# Patient Record
Sex: FEMALE | Race: ASIAN | URBAN - METROPOLITAN AREA
[De-identification: names, ages, dates, MRNs, and addresses within clinical notes are randomized per-mention and may not be internally consistent; named-entity substitution may affect disease eponyms.]

---

## 2022-07-29 ENCOUNTER — OFFICE VISIT (OUTPATIENT)
Dept: URBAN - METROPOLITAN AREA CLINIC 78 | Facility: CLINIC | Age: 42
End: 2022-07-29

## 2022-09-14 ENCOUNTER — OFFICE VISIT (OUTPATIENT)
Dept: URBAN - METROPOLITAN AREA CLINIC 78 | Facility: CLINIC | Age: 42
End: 2022-09-14

## 2023-04-20 ENCOUNTER — WEB ENCOUNTER (OUTPATIENT)
Dept: URBAN - METROPOLITAN AREA CLINIC 12 | Facility: CLINIC | Age: 43
End: 2023-04-20

## 2023-04-20 ENCOUNTER — OFFICE VISIT (OUTPATIENT)
Dept: URBAN - METROPOLITAN AREA CLINIC 12 | Facility: CLINIC | Age: 43
End: 2023-04-20
Payer: OTHER GOVERNMENT

## 2023-04-20 ENCOUNTER — LAB OUTSIDE AN ENCOUNTER (OUTPATIENT)
Dept: URBAN - METROPOLITAN AREA CLINIC 12 | Facility: CLINIC | Age: 43
End: 2023-04-20

## 2023-04-20 VITALS
BODY MASS INDEX: 24.62 KG/M2 | DIASTOLIC BLOOD PRESSURE: 80 MMHG | HEIGHT: 62 IN | HEART RATE: 66 BPM | WEIGHT: 133.8 LBS | SYSTOLIC BLOOD PRESSURE: 132 MMHG

## 2023-04-20 DIAGNOSIS — R11.0 NAUSEA: ICD-10-CM

## 2023-04-20 DIAGNOSIS — R10.13 EPIGASTRIC ABDOMINAL PAIN: ICD-10-CM

## 2023-04-20 PROCEDURE — 99203 OFFICE O/P NEW LOW 30 MIN: CPT | Performed by: INTERNAL MEDICINE

## 2023-04-20 NOTE — HPI-NAUSEA
Patient states nausea without vomiting.  Patient states nausea is present frequently.  Patient states nausea is worse after eating.  Patient states epigastric abdominal pain.  Patient denies any NSAIDs.

## 2023-04-20 NOTE — HPI-EPIGASTRIC PAIN
Patient presents with abdominal pain.  Patient states pain is the epigastric area.  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  Patient has follow-up with a surgeon.  Patient states pain is dull ache.  Patient denies radiation of the pain.  Patient denies association with eating or fasting.  Patient denies NSAIDs.  Patient states nausea but no vomiting.  Patient denies any fever or chills.

## 2023-04-24 PROBLEM — 79922009: Status: ACTIVE | Noted: 2023-04-24

## 2023-04-27 ENCOUNTER — OFFICE VISIT (OUTPATIENT)
Dept: URBAN - METROPOLITAN AREA CLINIC 35 | Facility: CLINIC | Age: 43
End: 2023-04-27

## 2023-06-20 ENCOUNTER — CLAIMS CREATED FROM THE CLAIM WINDOW (OUTPATIENT)
Dept: URBAN - METROPOLITAN AREA CLINIC 4 | Facility: CLINIC | Age: 43
End: 2023-06-20
Payer: OTHER GOVERNMENT

## 2023-06-20 ENCOUNTER — CLAIMS CREATED FROM THE CLAIM WINDOW (OUTPATIENT)
Dept: URBAN - METROPOLITAN AREA SURGERY CENTER 8 | Facility: SURGERY CENTER | Age: 43
End: 2023-06-20
Payer: OTHER GOVERNMENT

## 2023-06-20 ENCOUNTER — OFFICE VISIT (OUTPATIENT)
Dept: URBAN - METROPOLITAN AREA SURGERY CENTER 8 | Facility: SURGERY CENTER | Age: 43
End: 2023-06-20

## 2023-06-20 DIAGNOSIS — K29.60 OTHER GASTRITIS WITHOUT BLEEDING: ICD-10-CM

## 2023-06-20 DIAGNOSIS — R11.0 NAUSEA: ICD-10-CM

## 2023-06-20 DIAGNOSIS — R10.13 EPIGASTRIC ABDOMINAL PAIN: ICD-10-CM

## 2023-06-20 DIAGNOSIS — K31.89 OTHER DISEASES OF STOMACH AND DUODENUM: ICD-10-CM

## 2023-06-20 DIAGNOSIS — K21.00 ESOPHAGITIS, REFLUX: ICD-10-CM

## 2023-06-20 DIAGNOSIS — K21.9 GASTRO-ESOPHAGEAL REFLUX DISEASE WITHOUT ESOPHAGITIS: ICD-10-CM

## 2023-06-20 PROCEDURE — 88305 TISSUE EXAM BY PATHOLOGIST: CPT | Performed by: PATHOLOGY

## 2023-06-20 PROCEDURE — 88342 IMHCHEM/IMCYTCHM 1ST ANTB: CPT | Performed by: PATHOLOGY

## 2023-06-20 PROCEDURE — 43239 EGD BIOPSY SINGLE/MULTIPLE: CPT | Performed by: INTERNAL MEDICINE

## 2023-06-20 PROCEDURE — G8907 PT DOC NO EVENTS ON DISCHARG: HCPCS | Performed by: INTERNAL MEDICINE

## 2023-06-20 PROCEDURE — 00731 ANES UPR GI NDSC PX NOS: CPT | Performed by: NURSE ANESTHETIST, CERTIFIED REGISTERED

## 2023-06-20 PROCEDURE — 88312 SPECIAL STAINS GROUP 1: CPT | Performed by: PATHOLOGY

## 2023-07-10 ENCOUNTER — DASHBOARD ENCOUNTERS (OUTPATIENT)
Age: 43
End: 2023-07-10

## 2023-07-10 ENCOUNTER — LAB OUTSIDE AN ENCOUNTER (OUTPATIENT)
Dept: URBAN - METROPOLITAN AREA CLINIC 35 | Facility: CLINIC | Age: 43
End: 2023-07-10

## 2023-07-10 ENCOUNTER — TELEPHONE ENCOUNTER (OUTPATIENT)
Dept: URBAN - METROPOLITAN AREA CLINIC 35 | Facility: CLINIC | Age: 43
End: 2023-07-10

## 2023-07-10 ENCOUNTER — OFFICE VISIT (OUTPATIENT)
Dept: URBAN - METROPOLITAN AREA CLINIC 35 | Facility: CLINIC | Age: 43
End: 2023-07-10
Payer: OTHER GOVERNMENT

## 2023-07-10 VITALS
SYSTOLIC BLOOD PRESSURE: 120 MMHG | DIASTOLIC BLOOD PRESSURE: 82 MMHG | HEIGHT: 62 IN | BODY MASS INDEX: 24.11 KG/M2 | WEIGHT: 131 LBS

## 2023-07-10 DIAGNOSIS — K29.30 CHRONIC SUPERFICIAL GASTRITIS WITHOUT BLEEDING: ICD-10-CM

## 2023-07-10 DIAGNOSIS — R19.00 RETROPERITONEAL MASS: ICD-10-CM

## 2023-07-10 DIAGNOSIS — K21.9 GASTRO-ESOPHAGEAL REFLUX DISEASE WITHOUT ESOPHAGITIS: ICD-10-CM

## 2023-07-10 DIAGNOSIS — R10.13 EPIGASTRIC ABDOMINAL PAIN: ICD-10-CM

## 2023-07-10 PROBLEM — 266435005: Status: ACTIVE | Noted: 2023-07-10

## 2023-07-10 PROBLEM — 196735001: Status: ACTIVE | Noted: 2023-07-10

## 2023-07-10 PROCEDURE — 99214 OFFICE O/P EST MOD 30 MIN: CPT | Performed by: PHYSICIAN ASSISTANT

## 2023-07-10 PROCEDURE — 99214 OFFICE O/P EST MOD 30 MIN: CPT | Performed by: INTERNAL MEDICINE

## 2023-07-10 RX ORDER — PANTOPRAZOLE SODIUM 40 MG/1
1 TABLET TABLET, DELAYED RELEASE ORAL ONCE A DAY
Status: ACTIVE | COMMUNITY

## 2023-07-10 RX ORDER — DICYCLOMINE HYDROCHLORIDE 20 MG/1
1 TABLET TABLET ORAL
Qty: 30 | Refills: 1 | OUTPATIENT
Start: 2023-07-10 | End: 2023-09-08

## 2023-07-10 RX ORDER — LOSARTAN POTASSIUM 25 MG/1
1 TABLET TABLET ORAL ONCE A DAY
Status: ACTIVE | COMMUNITY

## 2023-07-10 RX ORDER — SUMATRIPTAN 5 MG/1
1 SPRAY AT ONSET OF HEADACHE IN ONE NOSTRIL MAY REPEAT DOSE AFTER 2 HOURS AS NEEDED SPRAY NASAL ONCE A DAY
Status: ACTIVE | COMMUNITY

## 2023-07-10 RX ORDER — ROSUVASTATIN CALCIUM 10 MG/1
1 TABLET TABLET, COATED ORAL ONCE A DAY
Status: ACTIVE | COMMUNITY

## 2023-07-10 NOTE — HPI-ENDOSCOPY (EGD) FOLLOWUP
42 year old female patient presents today for a follow up from her endoscopy. She denies any complications after her procedure. Since her procedure she denies any episodes of dysphagia, globus, a change in appetite or bowel habits.  EGD report shows: - LA Grade A reflux esophagitis with no bleeding. - Gastritis.   - Normal examined duodenum. Duodenum, biopsy: No significant abnormality. Stomach, antrum and body, biopsy: Chemical/Reactive Gastropathy. Esophagus, middle-third, distal, biopsy: Squamocolumnar mucosa with reflux-type changes.

## 2023-07-10 NOTE — HPI-NAUSEA
Admits continued episodes of nausea without vomiting since her last visit.   Last visit (4/20/2023): Patient states nausea without vomiting.  Patient states nausea is present frequently.  Patient states nausea is worse after eating.  Patient states epigastric abdominal pain.  Patient denies any NSAIDs.

## 2023-07-10 NOTE — HPI-EPIGASTRIC PAIN
Admits continued episodes of epigastric pain since her last visit and heartburn.  Pain onset 3 months ago.  Pain can radiate into RUQ.  Coffee will cause the pain, also tomatoes.  Patient admits to Pantoprazole 40mg daily without relief of this pain. She has not had an US done, but another imaging which revealed a retroperitoneal mass.   Hx of H. pylori that was treated 10 years ago.  Last visit (4/20/2023): Patient presents with abdominal pain.  Patient states pain is the epigastric area.  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  Patient has follow-up with a surgeon.  Patient states pain is dull ache.  Patient denies radiation of the pain.  Patient denies association with eating or fasting.  Patient denies NSAIDs.  Patient states nausea but no vomiting.  Patient denies any fever or chills.